# Patient Record
Sex: FEMALE | Race: BLACK OR AFRICAN AMERICAN | Employment: UNEMPLOYED | URBAN - METROPOLITAN AREA
[De-identification: names, ages, dates, MRNs, and addresses within clinical notes are randomized per-mention and may not be internally consistent; named-entity substitution may affect disease eponyms.]

---

## 2017-07-03 VITALS
TEMPERATURE: 98.7 F | WEIGHT: 146.5 LBS | DIASTOLIC BLOOD PRESSURE: 92 MMHG | OXYGEN SATURATION: 99 % | HEART RATE: 109 BPM | SYSTOLIC BLOOD PRESSURE: 131 MMHG | RESPIRATION RATE: 16 BRPM | BODY MASS INDEX: 26.8 KG/M2

## 2017-07-03 PROCEDURE — 99283 EMERGENCY DEPT VISIT LOW MDM: CPT

## 2017-07-03 PROCEDURE — 81025 URINE PREGNANCY TEST: CPT | Performed by: EMERGENCY MEDICINE

## 2017-07-03 PROCEDURE — 81001 URINALYSIS AUTO W/SCOPE: CPT | Performed by: EMERGENCY MEDICINE

## 2017-07-04 ENCOUNTER — HOSPITAL ENCOUNTER (EMERGENCY)
Age: 22
Discharge: HOME | End: 2017-07-04
Attending: EMERGENCY MEDICINE | Admitting: EMERGENCY MEDICINE
Payer: SELF-PAY

## 2017-07-04 DIAGNOSIS — Z32.02 ENCOUNTER FOR PREGNANCY TEST WITH RESULT NEGATIVE: ICD-10-CM

## 2017-07-04 DIAGNOSIS — R10.9 ABDOMINAL CRAMPING: Primary | ICD-10-CM

## 2017-07-04 LAB
APPEARANCE UR: CLEAR
BACTERIA URNS QL MICRO: NEGATIVE /HPF
BILIRUB UR QL: NEGATIVE
COLOR UR: YELLOW
EPITH CASTS URNS QL MICRO: ABNORMAL /LPF (ref 0–5)
GLUCOSE UR STRIP.AUTO-MCNC: NEGATIVE MG/DL
HCG UR QL: NEGATIVE
HGB UR QL STRIP: NEGATIVE
KETONES UR QL STRIP.AUTO: NEGATIVE MG/DL
LEUKOCYTE ESTERASE UR QL STRIP.AUTO: NEGATIVE
MUCOUS THREADS URNS QL MICRO: ABNORMAL /LPF
NITRITE UR QL STRIP.AUTO: NEGATIVE
PH UR STRIP: 8 [PH] (ref 5–8)
PROT UR STRIP-MCNC: 30 MG/DL
RBC #/AREA URNS HPF: ABNORMAL /HPF (ref 0–5)
SP GR UR REFRACTOMETRY: 1.03 (ref 1–1.03)
UROBILINOGEN UR QL STRIP.AUTO: 1 EU/DL (ref 0.2–1)
WBC URNS QL MICRO: ABNORMAL /HPF (ref 0–4)

## 2017-07-04 NOTE — ED TRIAGE NOTES
Pt states she has been having abdominal pain for 3 days ,  Pt states last period in may.   Tubes are tied per patient

## 2017-07-04 NOTE — ED PROVIDER NOTES
HPI Comments: 17yo female presents to ER complaining of 3 day history of intermittent abdominal cramping. States she has not had menstrual cycle since 15. States her tubes are tied. States she took pregnancy test and thinks she saw a faint line. Denies N/V, fever/chills, painful urination, vaginal discharge. Patient states she is here for pregnancy confirmation. Patient is a 23 y.o. female presenting with abdominal pain. Abdominal Pain    Pertinent negatives include no fever, no diarrhea, no nausea, no vomiting, no constipation and no dysuria. Past Medical History:   Diagnosis Date    Abnormal Papanicolaou smear of cervix     dysplasia    Chlamydia     age 10    Endometriosis     Essential hypertension     only when not pregnant   [de-identified] Gonorrhea     age 23    Postpartum depression     Preeclampsia     Trauma     per prenatal OB office reported       Past Surgical History:   Procedure Laterality Date    ABDOMEN SURGERY PROC UNLISTED      laproscopy     DELIVERY ONLY      HX GYN       x3    HX OTHER SURGICAL  2010    laproscopic for endometriosis         History reviewed. No pertinent family history. Social History     Social History    Marital status:      Spouse name: Rashawn Joseph    Number of children: 1    Years of education: N/A     Occupational History    Not on file. Social History Main Topics    Smoking status: Current Some Day Smoker    Smokeless tobacco: Never Used    Alcohol use No    Drug use: No    Sexual activity: Yes     Partners: Male     Birth control/ protection: None     Other Topics Concern    Not on file     Social History Narrative         ALLERGIES: Review of patient's allergies indicates no known allergies. Review of Systems   Constitutional: Negative for activity change, appetite change, chills and fever. Respiratory: Negative. Cardiovascular: Negative. Gastrointestinal: Positive for abdominal pain. Negative for abdominal distention, constipation, diarrhea, nausea and vomiting. Genitourinary: Positive for menstrual problem. Negative for difficulty urinating, dysuria, flank pain, pelvic pain, vaginal bleeding and vaginal discharge. Musculoskeletal: Negative. Skin: Negative. All other systems reviewed and are negative. Vitals:    07/03/17 2350   BP: (!) 131/92   Pulse: (!) 109   Resp: 16   Temp: 98.7 °F (37.1 °C)   SpO2: 99%   Weight: 66.5 kg (146 lb 8 oz)            Physical Exam   Constitutional: She is oriented to person, place, and time. She appears well-developed and well-nourished. No distress. HENT:   Mouth/Throat: Oropharynx is clear and moist.   Neck: Normal range of motion. Cardiovascular: Normal rate, regular rhythm and normal heart sounds. No murmur heard. Pulmonary/Chest: Effort normal and breath sounds normal.   Abdominal: Soft. Bowel sounds are normal. She exhibits no distension and no mass. There is no tenderness. There is no rebound, no guarding and no CVA tenderness. Musculoskeletal: Normal range of motion. Neurological: She is alert and oriented to person, place, and time. Skin: She is not diaphoretic. Psychiatric: She has a normal mood and affect. Nursing note and vitals reviewed. MDM  Number of Diagnoses or Management Options  Diagnosis management comments: 15yo female here for pregnancy testing. Abdominopelvic area non tender. UA normal, UPT negative.      ED Course       Procedures

## 2024-05-10 ENCOUNTER — HOSPITAL ENCOUNTER (EMERGENCY)
Facility: HOSPITAL | Age: 29
Discharge: HOME | End: 2024-05-10
Attending: EMERGENCY MEDICINE
Payer: COMMERCIAL

## 2024-05-10 VITALS
DIASTOLIC BLOOD PRESSURE: 89 MMHG | HEART RATE: 118 BPM | RESPIRATION RATE: 18 BRPM | OXYGEN SATURATION: 97 % | TEMPERATURE: 97.7 F | SYSTOLIC BLOOD PRESSURE: 135 MMHG

## 2024-05-10 DIAGNOSIS — H91.93 DECREASED HEARING OF BOTH EARS: Primary | ICD-10-CM

## 2024-05-10 PROCEDURE — 6370000000 HC RX 637 (ALT 250 FOR IP)

## 2024-05-10 PROCEDURE — 99283 EMERGENCY DEPT VISIT LOW MDM: CPT

## 2024-05-10 RX ORDER — ONDANSETRON 4 MG/1
4 TABLET, ORALLY DISINTEGRATING ORAL
Status: COMPLETED | OUTPATIENT
Start: 2024-05-10 | End: 2024-05-10

## 2024-05-10 RX ADMIN — ONDANSETRON 4 MG: 4 TABLET, ORALLY DISINTEGRATING ORAL at 23:15

## 2024-05-10 ASSESSMENT — PAIN - FUNCTIONAL ASSESSMENT: PAIN_FUNCTIONAL_ASSESSMENT: NONE - DENIES PAIN

## 2024-05-11 NOTE — DISCHARGE INSTRUCTIONS
You were evaluated in the emergency department today for decreased hearing.  I believe this may be related to your methadone medication, started today.  I would recommend not continuing with this medication until further evaluation by ENT.  Please call them to make an appointment, the information has been provided here. Return to the emergency department for any new or worsening symptoms including pain, numbness, weakness, vision changes, severe headache, or any other concerning symptoms to you.

## 2024-05-11 NOTE — ED TRIAGE NOTES
EMS stated pt brought from home with complaint of loss of hearing. EMS stated pt went to methadone clinic earlier in the day and that is the only medications the pt takes.     Pt stated when talking loudly right into ear that she can barely hear you

## 2024-05-11 NOTE — ED PROVIDER NOTES
EMERGENCY DEPARTMENT HISTORY AND PHYSICAL EXAM      Date: 5/10/2024  Patient Name: Lakia Hendrickson    History of Presenting Illness     Chief Complaint   Patient presents with   • Hearing Problem     EMS stated pt brought from home with complaint of loss of hearing. EMS stated pt went to methadone clinic earlier in the day and that is the only medications the pt takes.        History (Context): Lakia Hendrickson is a 28 y.o. female  presents to the ED today with chief complaint of ***      PCP: No primary care provider on file.    No current facility-administered medications for this encounter.     No current outpatient medications on file.       Past History     Past Medical History:   No past medical history on file.    Past Surgical History:  No past surgical history on file.    Family History:  No family history on file.    Social History:        Allergies:  No Known Allergies      Physical Exam     Vitals:    05/10/24 2211   BP: 135/89   Pulse: (!) 118   Resp: 18   Temp: 97.7 °F (36.5 °C)   TempSrc: Oral   SpO2: 97%       Physical Exam    Diagnostic Study Results     Labs -   No results found for this or any previous visit (from the past 12 hour(s)). Labs Reviewed - No data to display    Radiologic Studies -   No orders to display         The laboratory results, imaging results, and other diagnostic exams were reviewed in the EMR.    Medical Decision Making   I am the first provider for this patient.    I reviewed the vital signs, available nursing notes, past medical history, past surgical history, family history and social history.    Vital Signs-Reviewed the patient's vital signs.    ED EKG interpretation:  Rhythm: {[facility] ED EKG RHYTHM:54502}; and {[facility] ED EKG RATE:35272}. Rate (approx.): ***; Axis: {[facility] EKG AXIS DEVIATION:78922}; P wave: {[facility] ED EKG P WAVE:13640}; QRS interval: {[facility] ED EKG QRS INTERVAL:13635}; ST/T wave: {[facility] EKG ST/T WAVE:93080}; Other findings: {[facility] ED EKG OTHER

## 2024-05-11 NOTE — ED PROVIDER NOTES
EMERGENCY DEPARTMENT HISTORY AND PHYSICAL EXAM      Date: 5/10/2024  Patient Name: Lakia Hendrickson    History of Presenting Illness     Chief Complaint   Patient presents with    Hearing Problem     EMS stated pt brought from home with complaint of loss of hearing. EMS stated pt went to methadone clinic earlier in the day and that is the only medications the pt takes.        History (Context): Lakia Hendrickson is a 28 y.o. female  presents to the ED today with chief complaint of decreased hearing.  Patient states that she was started on methadone today, and received 40 mg at approximately 12 PM.  Patient states that she went home feeling well and then woke up this evening from sleep and noticed she had decreased hearing.  She reports that she is hearing a \"whooshing\" sound in both ears.  She denies any pain, vision changes, weakness, numbness, or syncope.  States that this is never happened to her before, and only recent change was start of methadone today.      PCP: No primary care provider on file.    No current facility-administered medications for this encounter.     No current outpatient medications on file.       Past History     Past Medical History:   No past medical history on file.    Past Surgical History:  No past surgical history on file.    Family History:  No family history on file.    Social History:        Allergies:  No Known Allergies      Physical Exam     Vitals:    05/10/24 2211   BP: 135/89   Pulse: (!) 118   Resp: 18   Temp: 97.7 °F (36.5 °C)   TempSrc: Oral   SpO2: 97%       Physical Exam  Vitals and nursing note reviewed.   HENT:      Head: Normocephalic and atraumatic.      Right Ear: Tympanic membrane, ear canal and external ear normal.      Left Ear: Tympanic membrane, ear canal and external ear normal.      Nose: Nose normal.      Mouth/Throat:      Mouth: Mucous membranes are moist.      Pharynx: Oropharynx is clear.   Eyes:      Pupils: Pupils are equal, round, and reactive to